# Patient Record
Sex: MALE | Race: WHITE | NOT HISPANIC OR LATINO | Employment: FULL TIME | URBAN - METROPOLITAN AREA
[De-identification: names, ages, dates, MRNs, and addresses within clinical notes are randomized per-mention and may not be internally consistent; named-entity substitution may affect disease eponyms.]

---

## 2023-12-12 ENCOUNTER — OFFICE VISIT (OUTPATIENT)
Dept: URGENT CARE | Facility: CLINIC | Age: 48
End: 2023-12-12

## 2023-12-12 VITALS
RESPIRATION RATE: 18 BRPM | OXYGEN SATURATION: 97 % | SYSTOLIC BLOOD PRESSURE: 140 MMHG | TEMPERATURE: 102.1 F | DIASTOLIC BLOOD PRESSURE: 78 MMHG | HEART RATE: 99 BPM

## 2023-12-12 DIAGNOSIS — R68.89 FLU-LIKE SYMPTOMS: ICD-10-CM

## 2023-12-12 DIAGNOSIS — R07.9 CHEST PAIN, UNSPECIFIED TYPE: Primary | ICD-10-CM

## 2023-12-12 DIAGNOSIS — R50.9 FEVER, UNSPECIFIED FEVER CAUSE: ICD-10-CM

## 2023-12-12 LAB
SARS-COV-2 AG UPPER RESP QL IA: NEGATIVE
VALID CONTROL: NORMAL

## 2023-12-12 PROCEDURE — 87636 SARSCOV2 & INF A&B AMP PRB: CPT

## 2023-12-12 PROCEDURE — 99213 OFFICE O/P EST LOW 20 MIN: CPT

## 2023-12-12 PROCEDURE — 87811 SARS-COV-2 COVID19 W/OPTIC: CPT

## 2023-12-12 RX ORDER — ASPIRIN 81 MG/1
324 TABLET, CHEWABLE ORAL ONCE
Status: COMPLETED | OUTPATIENT
Start: 2023-12-12 | End: 2023-12-12

## 2023-12-12 RX ORDER — ACETAMINOPHEN 325 MG/1
650 TABLET ORAL ONCE
Status: COMPLETED | OUTPATIENT
Start: 2023-12-12 | End: 2023-12-12

## 2023-12-12 RX ADMIN — ACETAMINOPHEN 650 MG: 325 TABLET ORAL at 17:25

## 2023-12-12 RX ADMIN — ASPIRIN 324 MG: 81 TABLET, CHEWABLE ORAL at 17:51

## 2023-12-12 NOTE — PROGRESS NOTES
[facility]        NAME: Ez Goodwin is a 57 y.o. male  : 1975    MRN: [id number]  DATE: 2023  TIME: 5:54 PM    Assessment and Plan   Chest pain, unspecified type [R07.9]  1. Chest pain, unspecified type  Transfer to other facility    aspirin chewable tablet 324 mg      2. Fever, unspecified fever cause  acetaminophen (TYLENOL) tablet 650 mg    Poct Covid 19 Rapid Antigen Test    Covid/Flu-Office Collect      3. Flu-like symptoms          EKG reveals NSR, borderline LVH - no prior EKG on file for comparison. Rapid COVID tests negative, will send COVID/flu PCR and follow-up if positive. One-time dose of tylenol given in clinic for fever and 324 mg aspirin given for chest pain. Advised patient he should go to the ER for further evaluation of symptoms as we can not rule out underlying condition in this setting. Recommend patient go to the ER via EMS transfer as he drove himself to the clinic but patient declining stating "he can't leave his truck here." AMA paper for EMS services signed. Transfer order placed to [facility]. Patient Instructions     Report to the ER immediately for further evaluation of symptoms. Chief Complaint     Chief Complaint   Patient presents with    covid symptoms     Pt c/o chills, body aches, fever, chest pain when taking a deep breath that started this afternoon         History of Present Illness       57year old male presents for sudden onset of chest pain, headache, chills, body aches, and fever that came on all of a sudden this afternoon while he was working. He denies any known sick contacts or triggers but reports a prior history of chest pain in the past "and his work-up is always normal." His last episode was a few months ago. He denies prior history of asthma, allergies, or known cardiac conditions. He denies cigarette smoking but he does smoke marijuana.  He reports constant chest pain located in the middle of his chest, worsened when exposed to cool air with occasional radiation to his left arm. He relates the pain feels like "his blood isn't adequately flowing." He has not had his COVID or flu vaccines for this year. He denies associated cough, congestion, NVD, abdominal pain, or shortness of breath. He has not tried any interventions for symptoms. He rates his current pain as 4/10. Fever  This is a new problem. The current episode started today. The problem occurs constantly. The problem has been unchanged. Associated symptoms include chest pain, chills, a fever, headaches and myalgias. Pertinent negatives include no abdominal pain, anorexia, arthralgias, change in bowel habit, congestion, coughing, diaphoresis, fatigue, joint swelling, nausea, neck pain, numbness, rash, sore throat, swollen glands, urinary symptoms, vertigo, visual change, vomiting or weakness. Exacerbated by: cold air. He has tried nothing for the symptoms. The treatment provided no relief. Review of Systems   Review of Systems   Constitutional:  Positive for chills and fever. Negative for activity change, appetite change, diaphoresis and fatigue. HENT:  Negative for congestion, postnasal drip, rhinorrhea and sore throat. Eyes:  Negative for visual disturbance. Respiratory:  Negative for cough, chest tightness and shortness of breath. Cardiovascular:  Positive for chest pain. Negative for palpitations. Gastrointestinal:  Negative for abdominal pain, anorexia, change in bowel habit, constipation, diarrhea, nausea and vomiting. Musculoskeletal:  Positive for myalgias. Negative for arthralgias, joint swelling and neck pain. Skin:  Negative for color change, pallor and rash. Allergic/Immunologic: Negative for environmental allergies and food allergies. Neurological:  Positive for dizziness (intermittent, not currently present) and headaches. Negative for vertigo, weakness, light-headedness and numbness.          Current Medications No current outpatient medications on file. No current facility-administered medications for this visit. Current Allergies     Allergies as of 12/12/2023 - Reviewed 12/12/2023   Allergen Reaction Noted    Penicillins Anaphylaxis 12/12/2023            The following portions of the patient's history were reviewed and updated as appropriate: allergies, current medications, past family history, past medical history, past social history, past surgical history and problem list.     History reviewed. No pertinent past medical history. History reviewed. No pertinent surgical history. History reviewed. No pertinent family history. Medications have been verified. Objective   /78   Pulse 99   Temp (!) 102.1 °F (38.9 °C) (Temporal)   Resp 18   SpO2 97%        Physical Exam     Physical Exam  Vitals and nursing note reviewed. Constitutional:       General: He is awake. He is not in acute distress. Appearance: Normal appearance. He is well-developed and normal weight. HENT:      Head: Normocephalic and atraumatic. Right Ear: Hearing, tympanic membrane, ear canal and external ear normal.      Left Ear: Hearing, tympanic membrane, ear canal and external ear normal.      Nose: No congestion or rhinorrhea. Right Turbinates: Not enlarged, swollen or pale. Left Turbinates: Not enlarged, swollen or pale. Right Sinus: No maxillary sinus tenderness or frontal sinus tenderness. Left Sinus: No maxillary sinus tenderness or frontal sinus tenderness. Mouth/Throat:      Lips: Pink. Mouth: Mucous membranes are moist.      Pharynx: Oropharynx is clear. Uvula midline. No oropharyngeal exudate or posterior oropharyngeal erythema. Tonsils: No tonsillar exudate or tonsillar abscesses. 2+ on the right. 2+ on the left. Eyes:      Conjunctiva/sclera: Conjunctivae normal.   Cardiovascular:      Rate and Rhythm: Normal rate and regular rhythm. Pulses: Normal pulses. Heart sounds: Normal heart sounds. Pulmonary:      Effort: Pulmonary effort is normal.      Breath sounds: Normal breath sounds. Chest:      Chest wall: Tenderness present. Musculoskeletal:      Cervical back: Full passive range of motion without pain, normal range of motion and neck supple. Lymphadenopathy:      Cervical: No cervical adenopathy. Skin:     General: Skin is warm and dry. Neurological:      General: No focal deficit present. Mental Status: He is alert and oriented to person, place, and time. Psychiatric:         Mood and Affect: Mood normal.         Behavior: Behavior normal. Behavior is cooperative. Thought Content:  Thought content normal.         Judgment: Judgment normal.

## 2023-12-13 ENCOUNTER — TELEPHONE (OUTPATIENT)
Dept: URGENT CARE | Facility: CLINIC | Age: 48
End: 2023-12-13

## 2023-12-13 LAB
FLUAV RNA RESP QL NAA+PROBE: NEGATIVE
FLUBV RNA RESP QL NAA+PROBE: NEGATIVE
SARS-COV-2 RNA RESP QL NAA+PROBE: POSITIVE